# Patient Record
(demographics unavailable — no encounter records)

---

## 2018-06-28 NOTE — OP
DATE OF PROCEDURE:  06/28/2018

 

PREOPERATIVE DIAGNOSIS:  Morbid obesity.

 

POSTOPERATIVE DIAGNOSIS:  Morbid obesity.

 

OPERATION PERFORMED:  Laparoscopic sleeve gastrectomy due to the ViSiGi device.

 

SURGEON:  Derek Mathis M.D.

 

ANESTHESIA:  General endotracheal.

 

INDICATIONS:  The patient is a 61-year-old white female.  She has undergone preoperative evaluation a
nd education, presents at this time for sleeve gastrectomy.

 

DESCRIPTION OF OPERATION:  Informed consent was obtained.  The patient was taken to the operating [facility] where general endotracheal anesthesia obtained with the patient in supine position.  Abdomen was pr
epped with ChloraPrep and draped in sterile fashion.  Local anesthetic was infiltrated and 5 mm supra
umbilical incision was created through which Veress needle was passed to the peritoneal cavity and pn
eumoperitoneum established using carbon dioxide up to a pressure of 15 mmHg.  A 5 mm trocar port was 
passed through this same incision.  Laparoscopic camera was passed through this port.  Under direct v
ision, 4 additional ports were placed including bilateral 5 mm subcostal ports, a 12 mm right paramed
norman port and a 15 mm left paramedian port.

 

A 5 mm epigastric incision was created through which Nathansen retractor was passed into the abdomina
l cavity and used to retract the left lobe of the liver.  The patient was placed into reverse Trendel
enburg position.

 

The ViSiGi device was advanced within the stomach and used to decompress this.  The pylorus was ident
ified and beginning 4 cm proximal to the pylorus, the omentum and vascular tissue along the greater c
urvature was divided using the LigaSure in an ascending fashion up to the angle of His.  All posterio
r adhesions were mobilized.  The short gastric vessels were carefully divided and then hemostasis was
 maintained using the LigaSure.  Once this was completely mobilized, the ViSiGi was carefully positio
tio at the level of the pylorus and placed to suction, which was clearly defining the lesser curvatur
e of the stomach.

 

The gastrectomy was then performed using a series of fires of the [employer] stapler using a green load 
followed by a gold load and a series of blue loads until completion of the gastrectomy.  The ViSiGi a
long the lesser curvature was used as a size 36 bougie to guide in the gastric division.  Care was ta
prasad to avoid narrowing the incisura or the gastroesophageal junction.

The integrity of the staple line was then assessed by insufflating gas through the ViSiGi while irrig
ating along the staple line.  There was no evidence of an air leak.  There was no evidence of bleedin
g along the staple line.

 

The resected stomach was then removed through the 15 mm port and the fascia was closed with 0 Vicryl 
suture using a GraNee needle.  I then closed the 12 mm port also using the GraNee needle and 0 Vicryl
 suture.  The Nathansen retractor was removed.  All ports and instruments were removed under direct v
ision.  All irrigant was aspirated.  Pneumoperitoneum was carefully evacuated.  Quarter percent Ricky
ine with epinephrine was infiltrated into each port site.  Skin edges approximated with 4-0 Monocryl 
subcuticular suture.  Dermabond was placed externally.  There were no complications.  The patient lilly
erated the procedure well and was taken to recovery room in stable condition.

 

FINDINGS:  The patient had a normal anatomy internally.  There was no scarring or inflammatory proces
s.  The liver was without any fatty infiltration.  The operation was performed without difficulty wit
h essentially no blood loss.  Hemoclips were not placed along the staple line, but a couple of tiny o
ozers were cauterized.  The patient tolerated the procedure well and was taken to recovery in stable 
condition.

## 2021-09-17 NOTE — BD
BONE DENSITOMETRY USING DEXA:

 

Date:  11/04/2020

 

HISTORY:  

Postmenopausal screening for osteoporosis. 

 

FINDINGS:

 

Lumbar Spine:       BMD (g/cm2)

    L1              0.756         T-Score:  -2.1   Z-Score:  -0.7 

    L2              0.881         T-Score:  -1.3   Z-Score:  0.3

    L3              0.803         T-Score:  -2.6   Z-Score:  -0.8 

    L4              0.783         T-Score:  -2.5   Z-Score:  -0.8 

    L1-L4           0.806         T-Score:  -2.2   Z-Score:  -0.6 

 

Femoral Neck:       0.634         T-Score:  -1.9   Z-Score:  -0.5 

Total Femur:        0.772         T-Score:  -1.4   Z-Score:  -0.3 

 

The 10 year fracture risk for a major osteoporotic fracture is 9.3% and for a hip fracture is 1.1%. 

 

IMPRESSION: 

Osteopenia. 

 

POS: AH Anesthesia Type: 1% lidocaine with 1:100,000 epinephrine and 408mcg clindamycin/ml and a 1:10 solution of 8.4% sodium bicarbonate